# Patient Record
(demographics unavailable — no encounter records)

---

## 2021-07-27 NOTE — [id number]
Post Anesthesia Note





- EVALUATION WITHIN 48HRS OF ANESTHETIC


Vital Signs in Normal Range: Yes


Patient Participated in Evaluation: Yes


Respiratory Function Stable: Yes


Airway Patent: Yes


Cardiovascular Function Stable: Yes


Hydration Status Stable: Yes


Pain Control Satisfactory: Yes


Nausea and Vomiting Control Satisfactory: Yes


Mental Status Recovered: Yes


Vital Signs: 


                                Last Vital Signs











Temp  97.6   07/27/21 0916


 


Pulse  55   07/27/21 0916


 


Resp  10   07/27/21 0916


 


BP  89/59   07/27/21 0916


 


Pulse Ox  93   07/27/21 0916

## 2021-07-27 NOTE — PCM.PRNOTE
- Free Text/Narrative


Note: 


Date: 7/27/2021


Procedure: diagnostic esophagogastroscopy


Indication: postprandial fullness, nausea, dysphagia, with reported history of 

hiatal hernia


Endoscopist: Joon Soriano MD





Findings: large amount of food in stomach. No hiatal hernia noted on 

retroflexion. Gross evidence of minor inflammatory change at distal esophagus. 





Detailed Report:





The patient was taken to the endoscopy suite and placed in left lateral 

decubitus position.  Timeout was performed and monitored anesthesia care was 

initiated.  A bite-block was placed.  The endoscope was inserted and advanced 

into the stomach.  On entering the stomach, a large amount of undigested food 

was encountered.  This took up nearly the entire space of the stomach.  The 

scope was retroflexed in order to examine for hiatal hernia, and no significant 

hiatal hernia was appreciated.  The scope was withdrawn into the distal 

esophagus.  The Z-line appeared normal and there looked to be perhaps some minor

inflammatory change in the distal esophagus.  Biopsies were obtained of distal 

esophageal mucosa with cold forceps.  Air was suctioned from the stomach prior 

to withdrawal of the scope.  The remainder of the esophagus appeared normal.  

Patient tolerated the procedure well.

## 2021-08-19 NOTE — CR
Chest: Portable view of the chest was obtained.

 

Comparison: No prior chest x-ray is available, prior chest CT of 

07/21/21 is available.

 

Heart size and mediastinum are normal.  Lungs are clear with no acute 

parenchymal change.  No acute osseous abnormality is appreciated.

 

Impression:

1.  Nothing acute is seen on portable chest x-ray.

 

Diagnostic code #1

## 2021-08-19 NOTE — EDM.PDOC
ED HPI GENERAL MEDICAL PROBLEM





- General


Chief Complaint: Gastrointestinal Problem


Stated Complaint: [facility]


Time Seen by Provider: 08/19/21 16:30


Source of Information: Reports: Patient


History Limitations: Reports: No Limitations





- History of Present Illness


INITIAL COMMENTS - FREE TEXT/NARRATIVE: 





50-year-old female presents the emergency department by way of [facility] with complaints of nausea, vomiting and diarrhea that started at 

around noon today.  Per the patient's report at about noon she became nauseated 

and then vomited immediately.  She states she has vomited too numerous times to 

count since about noon today.  She states that about an hour after the vomiting 

started she then developed diarrhea and has been incontinent of stool x3.  She 

states it is a watery type of diarrhea.  She denies any headache, fever, sore 

throat, shortness of breath or cough.  She denies any abdominal cramping.  She 

states she has not eaten since she developed the nausea and vomiting.  She does 

have a history of gastroparesis for which she has seen Dr. Soriano and is 

currently seeing Dr. Leah Goodwin, who recently started her on 

metoclopramide.  She is a pack-a-day smoker for the past 35 years.  She has not 

had Covid and she states she has not had her Covid vaccination.





- Related Data


                                    Allergies











Allergy/AdvReac Type Severity Reaction Status Date / Time


 


No Known Allergies Allergy   Verified 08/19/21 16:33











Home Meds: 


                                    Home Meds





Aspirin 81 mg PO DAILY 07/26/21 [History]


Ezetimibe [Zetia] 10 mg PO DAILY 07/26/21 [History]


Gabapentin [Neurontin] 100 mg PO DAILY 07/26/21 [History]


Levothyroxine Sodium [Levothyroxine] 175 mcg PO DAILY 07/26/21 [History]


Omeprazole 40 mg PO DAILY 07/26/21 [History]


PARoxetine [Paxil] 20 mg PO DAILY 07/26/21 [History]


Potassium Chloride 10 meq PO DAILY 07/26/21 [History]


Simvastatin [Zocor] 20 mg PO DAILY 07/26/21 [History]


Topiramate [Topiramate ER] 12.5 mg PO DAILY 07/26/21 [History]


diazePAM [Valium] 10 mg PO BEDTIME PRN 07/26/21 [History]


estradioL [Estradiol] 2 mg PO DAILY 07/26/21 [History]


tiZANidine HCl [Zanaflex] 2 mg PO Q6H PRN 07/26/21 [History]











Past Medical History


HEENT History: Reports: Allergic Rhinitis, Impaired Vision, Other (See Below)


Other HEENT History: wears glasses, has dentures


Cardiovascular History: Reports: High Cholesterol


Respiratory History: Reports: COPD, Sleep Apnea


Gastrointestinal History: Reports: Hiatal Hernia, Other (See Below)


Other Gastrointestinal History: LUQ pain


Genitourinary History: Reports: None


OB/GYN History: Reports: None


Musculoskeletal History: Reports: Fibromyalgia, Other (See Below)


Other Musculoskeletal History: sciatic vs restless legs vs neuropathy


Neurological History: Reports: CVA, Migraines, Seizure


Psychiatric History: Reports: Anxiety, Bipolar, Depression


Endocrine/Metabolic History: Reports: Obesity/BMI 30+, Other (See Below)


Other Endocrine/Metabolic History: peripheral neuropathy


Hematologic History: Reports: None


Immunologic History: Reports: None


Oncologic (Cancer) History: Reports: None


Dermatologic History: Reports: None





- Infectious Disease History


Infectious Disease History: Reports: None





- Past Surgical History


HEENT Surgical History: Reports: Naso-Sinus Surgery


GI Surgical History: Reports: Appendectomy


Female  Surgical History: Reports: Hysterectomy





Social & Family History





- Tobacco Use


Tobacco Use Status *Q: Current Every Day Tobacco User


Years of Tobacco use: 35


Packs/Tins Daily: 0.2





- Caffeine Use


Caffeine Use: Reports: None





- Recreational Drug Use


Recreational Drug Use: No





ED ROS GENERAL





- Review of Systems


Review Of Systems: Comprehensive ROS is negative, except as noted in HPI.





ED EXAM, GI/ABD





- Physical Exam


Exam: See Below


Exam Limited By: No Limitations


General Appearance: Alert, WD/WN, Mild Distress


Ears: Normal External Exam, Hearing Grossly Normal


Nose: Normal Inspection


Throat/Mouth: Normal Inspection, Normal Lips, Normal Voice, No Airway Compromise


Head: Atraumatic


Neck: Normal Inspection, Supple


Respiratory/Chest: No Respiratory Distress, Lungs Clear, Normal Breath Sounds, 

No Accessory Muscle Use, Chest Non-Tender


Cardiovascular: Normal Peripheral Pulses, Regular Rate, Rhythm, No Edema, No 

Murmur


GI/Abdominal Exam: Normal Bowel Sounds, Soft, Non-Tender, No Distention


 (Female) Exam: Deferred


Rectal (Female) Exam: Deferred


Back Exam: Normal Inspection, Full Range of Motion


Extremities: Normal Inspection, Normal Range of Motion, Non-Tender, No Pedal 

Edema, Normal Capillary Refill


Neurological: Alert, Oriented, Normal Cognition


Psychiatric: Normal Affect, Normal Mood


Skin Exam: Warm, Dry, Intact, Normal Color, No Rash


Lymphatic: No Adenopathy





Course





- Vital Signs


Text/Narrative:: 





As stated above patient presents with new onset nausea, vomiting, and diarrhea 

that started about noon today.  Ambulance was called to her home.  In route to 

the hospital she was given IV Zofran and 500 mL of normal saline.  Upon my 

assessment her exam is essentially unremarkable.  She currently denies any 

headache nausea or vomiting however she has use the bedside commode x1 and has 

been incontinent of stool x1.  Will order lab studies to include a CBC, CMP, 

magnesium, C-reactive protein, urinalysis with micro and culture if indicated, 

we will do a Covid swab.  I will do a flat and upright of the abdomen.


Last Recorded V/S: 


                                Last Vital Signs











Temp  99.7 F   08/19/21 16:30


 


Pulse  109 H  08/19/21 16:30


 


Resp  16   08/19/21 16:30


 


BP  108/69   08/19/21 16:30


 


Pulse Ox  96   08/19/21 16:30














- Orders/Labs/Meds


Orders: 


                               Active Orders 24 hr











 Category Date Time Status


 


 Abdomen 2V AP Flat Upright [CR] Stat Exams  08/19/21 16:50 Taken


 


 Chest 1V Frontal [CR] Stat Exams  08/19/21 18:05 Taken


 


 C DIFFICILE PCR W/REFLEX [MOLEC] Stat Lab  08/19/21 17:25 Received


 


 STOOL CULTURE/SHIGA TOXIN [MREF] Stat Lab  08/19/21 17:25 Received


 


 UA RFX ADIS AND CULT IF INDIC [URIN] Stat Lab  08/19/21 16:51 Ordered


 


 Sodium Chloride 0.9% [Saline Flush] Med  08/19/21 16:50 Active





 10 ml FLUSH ASDIRECTED PRN   


 


 Saline Lock Insert [OM.PC] Stat Oth  08/19/21 16:50 Ordered








                                Medication Orders





Sodium Chloride (Sodium Chloride 0.9% 10 Ml Syringe)  10 ml FLUSH ASDIRECTED PRN


   PRN Reason: Keep Vein Open


   Last Admin: 08/19/21 17:08  Dose: 10 ml


   Documented by: LYNN








Labs: 


                                Laboratory Tests











  08/19/21 08/19/21 08/19/21 Range/Units





  16:45 17:13 17:13 


 


WBC   11.34 H   (3.98-10.04)  K/mm3


 


RBC   5.76 H   (3.98-5.22)  M/mm3


 


Hgb   16.9 H   (11.2-15.7)  gm/dl


 


Hct   50.9 H   (34.1-44.9)  %


 


MCV   88.4   (79.4-94.8)  fl


 


MCH   29.3   (25.6-32.2)  pg


 


MCHC   33.2   (32.2-35.5)  g/dl


 


RDW Std Deviation   44.0   (36.4-46.3)  fL


 


Plt Count   251   (182-369)  K/mm3


 


MPV   9.6   (9.4-12.3)  fl


 


Neut % (Auto)   86.5 H   (34.0-71.1)  %


 


Lymph % (Auto)   4.4 L   (19.3-51.7)  %


 


Mono % (Auto)   7.8   (4.7-12.5)  %


 


Eos % (Auto)   0.8   (0.7-5.8)  


 


Baso % (Auto)   0.3   (0.1-1.2)  %


 


Neut # (Auto)   9.81 H   (1.56-6.13)  K/mm3


 


Lymph # (Auto)   0.50 L   (1.18-3.74)  K/mm3


 


Mono # (Auto)   0.89 H   (0.24-0.36)  K/mm3


 


Eos # (Auto)   0.09   (0.04-0.36)  K/mm3


 


Baso # (Auto)   0.03   (0.01-0.08)  K/mm3


 


Manual Slide Review   Abnormal smear   


 


Sodium    143  (136-145)  mEq/L


 


Potassium    4.7  (3.5-5.1)  mEq/L


 


Chloride    104  ()  mEq/L


 


Carbon Dioxide    25  (21-32)  mEq/L


 


Anion Gap    18.7 H  (5-15)  


 


BUN    11  (7-18)  mg/dL


 


Creatinine    1.1 H  (0.55-1.02)  mg/dL


 


Est Cr Clr Drug Dosing    46.17  mL/min


 


Estimated GFR (MDRD)    53  (>60)  mL/min


 


BUN/Creatinine Ratio    10.0 L  (14-18)  


 


Glucose    121 H  (70-99)  mg/dL


 


Calcium    9.1  (8.5-10.1)  mg/dL


 


Magnesium    1.5 L  (1.8-2.4)  mg/dL


 


Total Bilirubin    0.5  (0.2-1.0)  mg/dL


 


AST    77 H  (15-37)  U/L


 


ALT    60 H  (14-59)  U/L


 


Alkaline Phosphatase    128 H  ()  U/L


 


C-Reactive Protein    1.1 H*  (<1.0)  mg/dL


 


Total Protein    8.0  (6.4-8.2)  g/dl


 


Albumin    3.7  (3.4-5.0)  g/dl


 


Globulin    4.3  gm/dL


 


Albumin/Globulin Ratio    0.9 L  (1-2)  


 


SARS-CoV-2 RNA (PRABHJOT)  Positive H    (NEGATIVE)  











Meds: 


Medications











Generic Name Dose Route Start Last Admin





  Trade Name Freq  PRN Reason Stop Dose Admin


 


Sodium Chloride  10 ml  08/19/21 16:50  08/19/21 17:08





  Sodium Chloride 0.9% 10 Ml Syringe  FLUSH   10 ml





  ASDIRECTED PRN   Administration





  Keep Vein Open  














Discontinued Medications














Generic Name Dose Route Start Last Admin





  Trade Name Freq  PRN Reason Stop Dose Admin


 


Sodium Chloride  500 mls @ 500 mls/hr  08/19/21 16:52  08/19/21 17:08





  Normal Saline  IV  08/19/21 17:51  500 mls/hr





  .BOLUS ONE   Administration














- Re-Assessments/Exams


Free Text/Narrative Re-Assessment/Exam: 





08/19/21 18:11


Nothing acute is appreciated on flat and upright of the abdomen.


08/19/21 18:14


Hematology reveals a WBC of 11.34, hemoglobin 16.9, hematocrit 50.9, platelet 

count 251





Chemistry reveals a sodium of 143, potassium 4.7, chloride 104, anion gap 18.7, 

BUN 11, creatinine 1.1, glucose 121, magnesium 1.5, AST 77, ALT 60, alk phos 

128, C-reactive protein 1.1





Patient is Covid positive





Patient's white count and hemoglobin are slightly elevated likely due to her 

being dehydrated with an anion gap of 18.7.  I had already ordered for her to 

receive another 500 mL of normal saline.  Magnesium is slightly depressed at 1.5

.  We will supplement her with 400 mg of Mag-Ox orally.  LFTs are slightly 

elevated as well as CRP likely due to Covid as well





Nursing staff reports that patient is feeling better and is requesting to go 

home.  We will discharge her to home with recommendations that she quarantine 

for the next 10 days and have strong return precautions.  I have ordered a chest

 x-ray to obtain a baseline should the patient return with any sequela from the 

Covid.


08/19/21 18:15


She is not a candidate at this time for monoclonal antibody treatment.


08/19/21 18:40


Nothing acute is appreciated on portable view of the chest.


08/19/21 18:47


Patient states she is feeling better she will be discharged to home with strong 

return precautions.





Departure





- Departure


Time of Disposition: 18:48


Disposition: Home, Self-Care 01


Condition: Good


Clinical Impression: 


 COVID-19








- Discharge Information


Instructions:  COVID-19: How to Protect Yourself and Others - [facility], COVID-19: 

Quarantine vs. Isolation - [facility] (12/17/2020), COVID-19: What to Do if You Are 

Sick - [facility] (12/31/2020)


Referrals: 


Leah Elias MD [Primary Care Provider] - 


Forms:  ED Department Discharge


Additional Instructions: 


You were seen in the emergency department today with nausea, vomiting, and 

diarrhea.  Labs were completed today which did show you are slightly dehydrated 

and your magnesium level was slightly low.  This is likely due to the vomiting 

and diarrhea.  You were given magnesium supplementation while in the ER and IV 

fluids.  It was discovered that you do have COVID-19 and this is likely the 

cause of your symptoms.  At this time your oxygen level is good.  Your chest x-

ray looks clear.  You will be discharged home.  Be sure to get plenty of rest.  

Stay hydrated and try to eat to keep your nutrition up.  Should you develop 

severe shortness of breath do not hesitate to return to the emergency 

department.  You will need to isolate yourself for 10 days time.  May take 

Tylenol 650 mg every 4 hours as needed for fever or body aches.  It is strongly 

recommended that you stop smoking.





Sepsis Event Note (ED)





- Evaluation


Sepsis Screening Result: Possible Sepsis Risk





- Focused Exam


Vital Signs: 


                                   Vital Signs











  Temp Pulse Resp BP Pulse Ox


 


 08/19/21 16:30  99.7 F  109 H  16  108/69  96














- My Orders


Last 24 Hours: 


My Active Orders





08/19/21 16:50


Abdomen 2V AP Flat Upright [CR] Stat 


Sodium Chloride 0.9% [Saline Flush]   10 ml FLUSH ASDIRECTED PRN 


Saline Lock Insert [OM.PC] Stat 





08/19/21 16:51


UA RFX ADIS AND CULT IF INDIC [URIN] Stat 





08/19/21 17:25


C DIFFICILE PCR W/REFLEX [MOLEC] Stat 


STOOL CULTURE/SHIGA TOXIN [MREF] Stat 





08/19/21 18:05


Chest 1V Frontal [CR] Stat 














- Assessment/Plan


Last 24 Hours: 


My Active Orders





08/19/21 16:50


Abdomen 2V AP Flat Upright [CR] Stat 


Sodium Chloride 0.9% [Saline Flush]   10 ml FLUSH ASDIRECTED PRN 


Saline Lock Insert [OM.PC] Stat 





08/19/21 16:51


UA RFX ADIS AND CULT IF INDIC [URIN] Stat 





08/19/21 17:25


C DIFFICILE PCR W/REFLEX [MOLEC] Stat 


STOOL CULTURE/SHIGA TOXIN [MREF] Stat 





08/19/21 18:05


Chest 1V Frontal [CR] Stat

## 2021-08-19 NOTE — CR
Abdomen: Supine and upright views of the abdomen were obtained.

 

Comparison: No prior abdominal x-ray is available.

 

Bowel gas pattern is felt to be within normal limits.  Calcifications 

are seen within the pelvis which are most likely due to multiple 

phleboliths.  Bony structures appear within normal limits for the 

patient's age.  No free air is seen.

 

Impression:

1.  Findings as noted above.

2.  Nothing acute is definitely appreciated.

 

Diagnostic code #2

## 2021-08-21 NOTE — CT
CT chest

 

Technique: Multiple axial sections through the chest were obtained.  

Intravenous contrast was utilized.  Study has been performed as a 

pulmonary angiogram protocol.

 

Comparison: Prior chest x-ray performed earlier on the same day (6:52 

AM) and prior chest CT study of 07/21/21.

 

Findings: Pulmonary arteries are well opacified.  No filling defects 

are seen to indicate pulmonary embolism.

 

Thoracic aorta shows minimal atherosclerotic calcification with no 

aneurysm noted.  Small lymph nodes are seen within the mediastinum 

which are stable from prior chest CT study.  No pericardial thickening

 is seen.  Small portion of the visualized upper abdominal structures 

appear within normal limits.

 

Lung window settings were reviewed.  No acute parenchymal change is 

seen.  No pleural effusions or pneumothorax is seen.

 

Bone window settings were reviewed.  Minimal scattered degenerative 

change is seen within the thoracic spine.  No acute osseous finding is

 seen.

 

Impression:

1.  No findings of pulmonary embolism.

2.  Nothing acute is appreciated on CT study of the chest.

 

Diagnostic code #1

## 2021-08-21 NOTE — EDM.PDOC
ED HPI GENERAL MEDICAL PROBLEM





- General


Chief Complaint: Respiratory Problem


Stated Complaint: COVID+ / SOB /CHEST PAIN


Time Seen by Provider: 08/21/21 06:28





- History of Present Illness


INITIAL COMMENTS - FREE TEXT/NARRATIVE: 





Patient arrived ED by private vehicle





Complains of worsening chest pain and dyspnea since recent Covid diagnosis


Onset of symptoms was Thursday, 8/19/2021


Endorses nausea and vomiting and diarrhea


Associated cough with mild dyspnea


Associated fever, myalgias and headache


She was seen in ED that day and COVID-19 testing was positive


States diarrhea has persisted, vomiting is resolved


Reports worsening of dyspnea and chest pain


Cough is productive of phlegm





She has history of COPD


She currently smokes approximately 1 pack every 4 days


Denies history of coronary artery disease








- Related Data


                                    Allergies











Allergy/AdvReac Type Severity Reaction Status Date / Time


 


No Known Allergies Allergy   Verified 08/19/21 16:33











Home Meds: 


                                    Home Meds





Aspirin 81 mg PO DAILY 07/26/21 [History]


Ezetimibe [Zetia] 10 mg PO DAILY 07/26/21 [History]


Gabapentin [Neurontin] 100 mg PO DAILY 07/26/21 [History]


Levothyroxine Sodium [Levothyroxine] 175 mcg PO DAILY 07/26/21 [History]


Omeprazole 40 mg PO DAILY 07/26/21 [History]


PARoxetine [Paxil] 20 mg PO DAILY 07/26/21 [History]


Potassium Chloride 10 meq PO DAILY 07/26/21 [History]


Simvastatin [Zocor] 20 mg PO DAILY 07/26/21 [History]


Topiramate [Topiramate ER] 12.5 mg PO DAILY 07/26/21 [History]


diazePAM [Valium] 10 mg PO BEDTIME PRN 07/26/21 [History]


estradioL [Estradiol] 2 mg PO DAILY 07/26/21 [History]


tiZANidine HCl [Zanaflex] 2 mg PO Q6H PRN 07/26/21 [History]











Past Medical History


HEENT History: Reports: Allergic Rhinitis, Impaired Vision, Other (See Below)


Other HEENT History: wears glasses, has dentures


Cardiovascular History: Reports: High Cholesterol


Respiratory History: Reports: COPD, Sleep Apnea


Gastrointestinal History: Reports: Hiatal Hernia, Other (See Below)


Other Gastrointestinal History: LUQ pain


Genitourinary History: Reports: None


OB/GYN History: Reports: None


Musculoskeletal History: Reports: Fibromyalgia, Other (See Below)


Other Musculoskeletal History: sciatic vs restless legs vs neuropathy


Neurological History: Reports: CVA, Migraines, Seizure


Psychiatric History: Reports: Anxiety, Bipolar, Depression


Endocrine/Metabolic History: Reports: Obesity/BMI 30+, Other (See Below)


Other Endocrine/Metabolic History: peripheral neuropathy


Hematologic History: Reports: None


Immunologic History: Reports: None


Oncologic (Cancer) History: Reports: None


Dermatologic History: Reports: None





- Infectious Disease History


Infectious Disease History: Reports: None





- Past Surgical History


Head Surgeries/Procedures: Reports: None


HEENT Surgical History: Reports: Naso-Sinus Surgery


Cardiovascular Surgical History: Reports: None


Respiratory Surgical History: Reports: None


GI Surgical History: Reports: Appendectomy


Female  Surgical History: Reports: Hysterectomy


Endocrine Surgical History: Reports: None


Neurological Surgical History: Reports: None


Dermatological Surgical History: Reports: None





Social & Family History





- Tobacco Use


Tobacco Use Status *Q: Current Every Day Tobacco User


Years of Tobacco use: 35


Packs/Tins Daily: 0.2





- Caffeine Use


Caffeine Use: Reports: None





- Recreational Drug Use


Recreational Drug Use: No





ED ROS GENERAL





- Review of Systems


Review Of Systems: See Below


Free Text/Narrative/Comment: 





Constitutional - fever


Eyes - no eye pain; no visual disturbance


ENT - no rhinorrhea; no congestion; no epistaxis


Cardiovascular - chest pain


Respiratory - shortness of breath; cough


Gastrointestinal - abdominal pain; nausea; vomiting; diarrhea


Genitourinary - no dysuria


Musculoskeletal - no neck pain; no back pain; no extremity injury; myalgias


Neurological - headache; no speech disturbance; no weakness








ED EXAM, GENERAL





- Physical Exam


Exam: See Below


Free Text/Narrative:: 





Constitutional - awake; alert; mild general distress


Head - no facial swelling or weakness


Eyes - extra ocular motion intact; conjunctiva normal


ENT - no nasal deformity; no epistaxis; normal phonation; mucus membranes moist 


Neck - no swelling


Respiratory - mild respiratory effort; no crackles or wheezing; no stridor; 

mildly diminished breath sounds bilaterally


Cardiovascular - regular rhythm; normal rate; S1; S2; grade 1/6 systolic murmur


GI/Abdomen - normal bowel sounds; soft; mild left abdomen tenderness; no 

rebound; no guarding; no mass


Musculoskeletal - grossly normal strength and motion; no swelling or deformity


Skin - warm; dry


Neurologic - normal speech; no weakness


Psychiatric - normal mood and affect; memory and attention normal





  ** #1 Interpretation


EKG Date: 08/21/21


Time: 06:40


Rhythm: NSR


Rate (Beats/Min): 72


Axis: Normal


P-Wave: Present


QRS: Normal


ST-T: Normal


Comparison: NA - No Prior EKG





Course





- Vital Signs


Text/Narrative:: 





.


Considered etiologies included:


Chest pain, dyspnea, pneumonia, viral syndrome, COVID-19, ACS/angina, pulmonary 

embolism








Symptoms and examination were discussed


No specific treatment or intervention was required at initial evaluation by 

writer


Investigations were initiated


Radiography did not show evidence for pneumonia


Initial lab results showed mild transaminitis, elevated D-dimer


CT angiography was performed to evaluate for pulmonary embolism and was also 

negative


Symptomatic treatment was reviewed


Patient was felt to be stable for outpatient follow-up


Return precautions were provided





Last Recorded V/S: 


                                Last Vital Signs











Temp  37.1 C   08/21/21 05:50


 


Pulse  85   08/21/21 05:50


 


Resp  18   08/21/21 05:50


 


BP  104/65   08/21/21 05:50


 


Pulse Ox  93 L  08/21/21 05:50














- Orders/Labs/Meds


Labs: 


                                Laboratory Tests











  08/21/21 08/21/21 08/21/21 Range/Units





  05:45 05:45 05:45 


 


WBC  7.14    (3.98-10.04)  K/mm3


 


RBC  4.89    (3.98-5.22)  M/mm3


 


Hgb  14.5  D    (11.2-15.7)  gm/dl


 


Hct  44.7    (34.1-44.9)  %


 


MCV  91.4  D    (79.4-94.8)  fl


 


MCH  29.7    (25.6-32.2)  pg


 


MCHC  32.4    (32.2-35.5)  g/dl


 


RDW Std Deviation  46.3    (36.4-46.3)  fL


 


Plt Count  203    (182-369)  K/mm3


 


MPV  9.8    (9.4-12.3)  fl


 


Neut % (Auto)  49.9    (34.0-71.1)  %


 


Lymph % (Auto)  39.5    (19.3-51.7)  %


 


Mono % (Auto)  8.5    (4.7-12.5)  %


 


Eos % (Auto)  1.4    (0.7-5.8)  


 


Baso % (Auto)  0.6    (0.1-1.2)  %


 


Neut # (Auto)  3.56    (1.56-6.13)  K/mm3


 


Lymph # (Auto)  2.82    (1.18-3.74)  K/mm3


 


Mono # (Auto)  0.61 H    (0.24-0.36)  K/mm3


 


Eos # (Auto)  0.10    (0.04-0.36)  K/mm3


 


Baso # (Auto)  0.04    (0.01-0.08)  K/mm3


 


D-Dimer, Quantitative   2.41 H   (0.19-0.50)  mg/L


 


Sodium    140  (136-145)  mEq/L


 


Potassium    4.0  (3.5-5.1)  mEq/L


 


Chloride    106  ()  mEq/L


 


Carbon Dioxide    25  (21-32)  mEq/L


 


Anion Gap    13.0  (5-15)  


 


BUN    10  (7-18)  mg/dL


 


Creatinine    1.1 H  (0.55-1.02)  mg/dL


 


Est Cr Clr Drug Dosing    52.84  mL/min


 


Estimated GFR (MDRD)    53  (>60)  mL/min


 


BUN/Creatinine Ratio    9.1 L  (14-18)  


 


Glucose    96  (70-99)  mg/dL


 


Calcium    8.2 L  (8.5-10.1)  mg/dL


 


Total Bilirubin    0.5  (0.2-1.0)  mg/dL


 


AST    304 H  (15-37)  U/L


 


ALT    183 H  (14-59)  U/L


 


Alkaline Phosphatase    81  ()  U/L


 


Troponin I    < 0.017  (0.00-0.056)  ng/mL


 


Total Protein    7.0  (6.4-8.2)  g/dl


 


Albumin    3.2 L  (3.4-5.0)  g/dl


 


Globulin    3.8  gm/dL


 


Albumin/Globulin Ratio    0.8 L  (1-2)  











Meds: 


Medications














Discontinued Medications














Generic Name Dose Route Start Last Admin





  Trade Name Freq  PRN Reason Stop Dose Admin


 


Sodium Chloride  45 mls @ 40 mls/hr  08/21/21 07:30  08/21/21 07:46





  Normal Saline  IV   40 mls/hr





  ASDIRECTED SARAH   Administration


 


Iopamidol  100 ml  08/21/21 07:20  08/21/21 07:46





  Iopamidol 755 Mg/Ml 100 Ml Bottle  IVPUSH  08/21/21 07:21  100 ml





  ONETIME ONE   Administration


 


Sodium Chloride  10 ml  08/21/21 07:20  08/21/21 07:46





  Sodium Chloride 0.9% 10 Ml Syringe  FLUSH   10 ml





  ONETIME PRN   Administration





  Keep Vein Open  














- Radiology Interpretation


Free Text/Narrative:: 





XR chest, AP portable, radiology interpretation:


Nothing acute is seen on portable chest x-ray





CTA chest, IV contrast, radiology interpretation:


1. No findings of pulmonary wisdom


2. Nothing acute is appreciated on CT study of the chest








Departure





- Departure


Time of Disposition: 08:23


Disposition: Home, Self-Care 01


Clinical Impression: 


 COVID-19








- Discharge Information


*PRESCRIPTION DRUG MONITORING PROGRAM REVIEWED*: Not Applicable


*COPY OF PRESCRIPTION DRUG MONITORING REPORT IN PATIENT DAMARIS: Not Applicable


Instructions:  COVID-19 Frequently Asked Questions, Oximetry, COVID-19: What to 

Do if You Are Sick - CDC (12/31/2020)


Referrals: 


Leah Elias MD [Primary Care Provider] - 


Forms:  ED Department Discharge


Additional Instructions: 


Return if condition worsens


Continue self-isolation at home for COVID-19 infection, in accordance with CDC 

guidelines


May otherwise resume general activity and regular diet as tolerated


Continue usual medications


May take IBUPROFEN 600 mg every 6 hours as needed for pain/inflammation


Use pulse oximeter as needed for monitoring of oxygen saturation; if levels drop

below 90% for a prolonged or persistent duration return to ED for reevaluation


Follow-up with primary care provider is recommended








Sepsis Event Note (ED)





- Evaluation


Sepsis Screening Result: No Definite Risk

## 2021-08-21 NOTE — CR
Chest: Portable view of the chest was obtained.

 

Comparison: Prior chest x-ray of 08/19/21.

 

Heart size and mediastinum are within normal limits.  Lungs are clear 

with no acute parenchymal change.  No acute osseous abnormality is 

appreciated.

 

Impression:

1.  Nothing acute is seen on portable chest x-ray.

 

Diagnostic code #1